# Patient Record
Sex: MALE | Race: WHITE
[De-identification: names, ages, dates, MRNs, and addresses within clinical notes are randomized per-mention and may not be internally consistent; named-entity substitution may affect disease eponyms.]

---

## 2019-12-31 ENCOUNTER — HOSPITAL ENCOUNTER (EMERGENCY)
Dept: HOSPITAL 44 - ED | Age: 34
Discharge: HOME | End: 2019-12-31
Payer: COMMERCIAL

## 2019-12-31 VITALS — SYSTOLIC BLOOD PRESSURE: 148 MMHG | DIASTOLIC BLOOD PRESSURE: 92 MMHG

## 2019-12-31 DIAGNOSIS — J01.90: Primary | ICD-10-CM

## 2019-12-31 PROCEDURE — 87070 CULTURE OTHR SPECIMN AEROBIC: CPT

## 2019-12-31 PROCEDURE — 99282 EMERGENCY DEPT VISIT SF MDM: CPT

## 2019-12-31 PROCEDURE — 87880 STREP A ASSAY W/OPTIC: CPT

## 2019-12-31 NOTE — ED PHYSICIAN DOCUMENTATION
Upper Respiratory Symptoms





- HISTORIAN


Historian: patient





- HPI


Chief Complaint: Cough/ Upper Respiratory


Additional Information: 


34 year old male presents with c/o cough, congestion, sore throat, sinus 

pressure, wheezing, nasal congestion and headache.  Sx's started 4 days ago; 

family has all been sick.


Onset: days ago


Duration: constant


Context: same sx (family has been ill)


Severity: moderate


Associated Symptoms: runny nose, sinus pain, sinus drainage, sore throat, 

productive cough


Worsened by Deep Breath: No





- ROS


CONST/EYES: denies: eye redness, eye itching


CVS/RESP: none


LYMPH: denies: rash


GI/: none


NEURO/PSYCH: denies: dizziness


MS/SKIN: denies: muscle aches





- PAST HX


Lung Disease: none


PE Risk Factors: none


Immunizations: UTD


Allergies/Adverse Reactions: 


                                    Allergies











Allergy/AdvReac Type Severity Reaction Status Date / Time


 


Sulfa (Sulfonamide Allergy Intermediate Hives Verified 12/31/19 10:43





Antibiotics)     














Home Medications: 


                                Ambulatory Orders











 Medication  Instructions  Recorded


 


NK  03/31/16














- SOCIAL HX


Smoking History: non-smoker


Alcohol Use: none


Drug Use: none





- FAMILY HX


Family History: none





- VITAL SIGNS


Vital Signs: 


                                   Vital Signs











Temp Pulse Resp BP Pulse Ox


 


 98.4 F   75   14   148/92   100 


 


 12/31/19 10:53  12/31/19 10:53  12/31/19 10:53  12/31/19 10:53  12/31/19 10:53














- REVIEWED ASSESSMENTS


Nursing Assessment  Reviewed: Yes


Vitals Reviewed: Yes





ED Results Lab/Radiology





- Orders


Orders: 


                                    ED Orders











 Category Date Time Status


 


 Rapid Strep [GRP A STREP SCREEN] Stat Lab  12/31/19 Ordered














Upper Respiratory Symptoms





- EXAM


General Appearance: alert, mild distress


EENT: eyes nml inspection, nml ENT inspection, lids & conjunct. nml, PERRL, ear 

nml, pain over sinuses, maxillary, rhinorrhea, airway nml, pharyngeal erythema


Neck: normal inspection, supple


Respiratory: no resp. distress, breath sounds nml, speaks full sentences


CVS: heart sounds normal, equal pulses


Skin: color nml, no rash, warm,dry


Extremities: non-tender, normal range of motion


Neuro/Psych: oriented x3, neuro intact, mood/affect nml





Discharge


Clincal Impression: 


 Acute sinus infection





Referrals: 


Primary Doctor,No [Primary Care Provider] - 2 Days


Additional Instructions: 


Take antibiotic; augmentin 875mg by mouth twice a day for 10 days


Steroid; Medrol dose pack as directed


Albuterol inhaler; 1-2 puffs every 4-6 hours as needed for shortness of breath 

or wheezing


(meds called in to [facility])


Use a humidifier


Warm salt water gargles 4 times a day


1 cup of hot tea with tsp of honey will thin secretions


Follow up with PCP in one week for re-evaluation


Condition: Good


Disposition: 01 HOME, SELF-CARE


Decision to Admit: NO


Decision Time: 13:14